# Patient Record
Sex: FEMALE | URBAN - METROPOLITAN AREA
[De-identification: names, ages, dates, MRNs, and addresses within clinical notes are randomized per-mention and may not be internally consistent; named-entity substitution may affect disease eponyms.]

---

## 2020-06-18 ENCOUNTER — APPOINTMENT (RX ONLY)
Dept: URBAN - METROPOLITAN AREA CLINIC 80 | Facility: CLINIC | Age: 50
Setting detail: DERMATOLOGY
End: 2020-06-18

## 2020-06-18 DIAGNOSIS — E85.4 ORGAN-LIMITED AMYLOIDOSIS: ICD-10-CM | Status: INADEQUATELY CONTROLLED

## 2020-06-18 DIAGNOSIS — L29.89 OTHER PRURITUS: ICD-10-CM

## 2020-06-18 DIAGNOSIS — B35.3 TINEA PEDIS: ICD-10-CM

## 2020-06-18 DIAGNOSIS — L29.8 OTHER PRURITUS: ICD-10-CM

## 2020-06-18 PROCEDURE — ? COUNSELING

## 2020-06-18 PROCEDURE — ? PRESCRIPTION

## 2020-06-18 PROCEDURE — 99202 OFFICE O/P NEW SF 15 MIN: CPT

## 2020-06-18 PROCEDURE — ? TREATMENT REGIMEN

## 2020-06-18 RX ORDER — TRIAMCINOLONE ACETONIDE 1 MG/G
OINTMENT TOPICAL
Qty: 1 | Refills: 0 | Status: ERX | COMMUNITY
Start: 2020-06-18

## 2020-06-18 RX ADMIN — TRIAMCINOLONE ACETONIDE: 1 OINTMENT TOPICAL at 00:00

## 2020-06-18 ASSESSMENT — LOCATION DETAILED DESCRIPTION DERM
LOCATION DETAILED: LEFT LATERAL PLANTAR FOOT
LOCATION DETAILED: LEFT LATERAL DORSAL FOOT
LOCATION DETAILED: LEFT SUPERIOR LATERAL LOWER BACK
LOCATION DETAILED: SUPERIOR THORACIC SPINE

## 2020-06-18 ASSESSMENT — LOCATION SIMPLE DESCRIPTION DERM
LOCATION SIMPLE: LEFT LOWER BACK
LOCATION SIMPLE: UPPER BACK
LOCATION SIMPLE: LEFT FOOT

## 2020-06-18 ASSESSMENT — SEVERITY ASSESSMENT: SEVERITY: MILD TO MODERATE

## 2020-06-18 ASSESSMENT — LOCATION ZONE DERM
LOCATION ZONE: FEET
LOCATION ZONE: TRUNK

## 2020-06-18 NOTE — PROCEDURE: TREATMENT REGIMEN
Detail Level: Zone
Initiate Treatment: Lamisil AT topical cream OTC\\nDays Supply: 30\\nSig: Apply to affected area twice a day for 14 days
Initiate Treatment: triamcinolone acetonide 0.1 % topical ointment \\nDays Supply: 30\\nSig: Apply to affected areas twice daily for itching

## 2022-08-18 ENCOUNTER — RX ONLY (OUTPATIENT)
Age: 52
Setting detail: RX ONLY
End: 2022-08-18

## 2023-10-06 ENCOUNTER — APPOINTMENT (RX ONLY)
Dept: URBAN - METROPOLITAN AREA CLINIC 114 | Facility: CLINIC | Age: 53
Setting detail: DERMATOLOGY
End: 2023-10-06

## 2023-10-06 VITALS — HEIGHT: 55 IN | WEIGHT: 123 LBS

## 2023-10-06 DIAGNOSIS — L81.1 CHLOASMA: ICD-10-CM

## 2023-10-06 DIAGNOSIS — L20.89 OTHER ATOPIC DERMATITIS: ICD-10-CM

## 2023-10-06 DIAGNOSIS — L29.89 OTHER PRURITUS: ICD-10-CM

## 2023-10-06 DIAGNOSIS — L29.8 OTHER PRURITUS: ICD-10-CM

## 2023-10-06 PROBLEM — L30.9 DERMATITIS, UNSPECIFIED: Status: ACTIVE | Noted: 2023-10-06

## 2023-10-06 PROCEDURE — ? PRESCRIPTION

## 2023-10-06 PROCEDURE — ? COUNSELING

## 2023-10-06 PROCEDURE — ? TREATMENT REGIMEN

## 2023-10-06 PROCEDURE — 99204 OFFICE O/P NEW MOD 45 MIN: CPT

## 2023-10-06 RX ORDER — HYDROQUINONE 4 %
CREAM (GRAM) TOPICAL NIGHTLY
Qty: 30 | Refills: 1 | Status: ERX | COMMUNITY
Start: 2023-10-06

## 2023-10-06 RX ORDER — TRIAMCINOLONE ACETONIDE 1 MG/G
CREAM TOPICAL
Qty: 454 | Refills: 3 | Status: ERX | COMMUNITY
Start: 2023-10-06

## 2023-10-06 RX ADMIN — TRIAMCINOLONE ACETONIDE: 1 CREAM TOPICAL at 00:00

## 2023-10-06 RX ADMIN — Medication: at 00:00

## 2023-10-06 ASSESSMENT — LOCATION DETAILED DESCRIPTION DERM
LOCATION DETAILED: LEFT MID-UPPER BACK
LOCATION DETAILED: LEFT LATERAL PLANTAR FOOT
LOCATION DETAILED: RIGHT CENTRAL MALAR CHEEK
LOCATION DETAILED: LEFT CENTRAL MALAR CHEEK

## 2023-10-06 ASSESSMENT — LOCATION ZONE DERM
LOCATION ZONE: FACE
LOCATION ZONE: FEET
LOCATION ZONE: TRUNK

## 2023-10-06 ASSESSMENT — LOCATION SIMPLE DESCRIPTION DERM
LOCATION SIMPLE: LEFT CHEEK
LOCATION SIMPLE: LEFT UPPER BACK
LOCATION SIMPLE: RIGHT CHEEK
LOCATION SIMPLE: LEFT FOOT

## 2023-10-06 NOTE — PROCEDURE: TREATMENT REGIMEN
Detail Level: Zone
Initiate Treatment: Triamcinolone cream .1% apply 1-2x a day for max 2 weeks for itching repeat again in 2 weeks
Otc Regimen: Lidocaine patch